# Patient Record
Sex: FEMALE | Race: WHITE | NOT HISPANIC OR LATINO | Employment: FULL TIME | URBAN - METROPOLITAN AREA
[De-identification: names, ages, dates, MRNs, and addresses within clinical notes are randomized per-mention and may not be internally consistent; named-entity substitution may affect disease eponyms.]

---

## 2017-05-23 DIAGNOSIS — Z12.31 ENCOUNTER FOR SCREENING MAMMOGRAM FOR MALIGNANT NEOPLASM OF BREAST: ICD-10-CM

## 2017-07-06 ENCOUNTER — HOSPITAL ENCOUNTER (OUTPATIENT)
Dept: MAMMOGRAPHY | Facility: MEDICAL CENTER | Age: 47
Discharge: HOME | End: 2017-07-06
Payer: COMMERCIAL

## 2017-07-06 DIAGNOSIS — Z12.31 ENCOUNTER FOR SCREENING MAMMOGRAM FOR MALIGNANT NEOPLASM OF BREAST: ICD-10-CM

## 2017-07-06 PROCEDURE — G0202 SCR MAMMO BI INCL CAD: HCPCS

## 2017-07-06 PROCEDURE — 77063 BREAST TOMOSYNTHESIS BI: CPT

## 2017-07-11 ENCOUNTER — ALLSCRIPTS OFFICE VISIT (OUTPATIENT)
Dept: OTHER | Facility: OTHER | Age: 47
End: 2017-07-11

## 2017-07-12 ENCOUNTER — GENERIC CONVERSION - ENCOUNTER (OUTPATIENT)
Dept: OTHER | Facility: OTHER | Age: 47
End: 2017-07-12

## 2017-07-17 ENCOUNTER — GENERIC CONVERSION - ENCOUNTER (OUTPATIENT)
Dept: OTHER | Facility: OTHER | Age: 47
End: 2017-07-17

## 2017-07-17 LAB
ADEQUACY: (HISTORICAL): NORMAL
CLINICIAN PROVIDIED ICD 9 OR 10 (HISTORICAL): NORMAL
COMMENT (HISTORICAL): NORMAL
DIAGNOSIS (HISTORICAL): NORMAL
NOTE: (HISTORICAL): NORMAL
PERFORMED BY (HISTORICAL): NORMAL
REFLEX (HISTORICAL): NORMAL

## 2018-01-15 VITALS
BODY MASS INDEX: 24.93 KG/M2 | WEIGHT: 135.5 LBS | SYSTOLIC BLOOD PRESSURE: 102 MMHG | DIASTOLIC BLOOD PRESSURE: 64 MMHG | HEIGHT: 62 IN

## 2018-06-07 ENCOUNTER — TRANSCRIBE ORDERS (OUTPATIENT)
Dept: ADMINISTRATIVE | Facility: HOSPITAL | Age: 48
End: 2018-06-07

## 2018-06-07 DIAGNOSIS — Z12.39 ENCOUNTER FOR SCREENING FOR MALIGNANT NEOPLASM OF BREAST: Primary | ICD-10-CM

## 2018-06-07 DIAGNOSIS — Z12.39 SCREENING BREAST EXAMINATION: Primary | ICD-10-CM

## 2018-07-10 ENCOUNTER — HOSPITAL ENCOUNTER (OUTPATIENT)
Dept: MAMMOGRAPHY | Facility: MEDICAL CENTER | Age: 48
Discharge: HOME | End: 2018-07-10
Payer: COMMERCIAL

## 2018-07-10 DIAGNOSIS — Z12.39 ENCOUNTER FOR SCREENING FOR MALIGNANT NEOPLASM OF BREAST: ICD-10-CM

## 2018-07-10 PROCEDURE — 77063 BREAST TOMOSYNTHESIS BI: CPT

## 2018-07-10 PROCEDURE — 77067 SCR MAMMO BI INCL CAD: CPT

## 2018-07-12 ENCOUNTER — ANNUAL EXAM (OUTPATIENT)
Dept: GYNECOLOGY | Facility: CLINIC | Age: 48
End: 2018-07-12

## 2018-07-12 VITALS
WEIGHT: 133.2 LBS | BODY MASS INDEX: 24.51 KG/M2 | HEIGHT: 62 IN | DIASTOLIC BLOOD PRESSURE: 72 MMHG | SYSTOLIC BLOOD PRESSURE: 116 MMHG

## 2018-07-12 DIAGNOSIS — Z01.419 ENCOUNTER FOR GYNECOLOGICAL EXAMINATION WITHOUT ABNORMAL FINDING: Primary | ICD-10-CM

## 2018-07-12 PROCEDURE — G0145 SCR C/V CYTO,THINLAYER,RESCR: HCPCS | Performed by: OBSTETRICS & GYNECOLOGY

## 2018-07-12 PROCEDURE — S0612 ANNUAL GYNECOLOGICAL EXAMINA: HCPCS | Performed by: OBSTETRICS & GYNECOLOGY

## 2018-07-12 RX ORDER — CALCIUM CARBONATE 500(1250)
TABLET ORAL
COMMUNITY

## 2018-07-12 RX ORDER — AMOXICILLIN 500 MG
CAPSULE ORAL DAILY
COMMUNITY

## 2018-07-12 RX ORDER — MELATONIN
1000 DAILY
COMMUNITY

## 2018-07-12 RX ORDER — ASPIRIN 81 MG/1
1 TABLET, CHEWABLE ORAL DAILY
COMMUNITY
End: 2021-08-06

## 2018-07-12 NOTE — PROGRESS NOTES
Assessment/Plan:    No problem-specific Assessment & Plan notes found for this encounter  Diagnoses and all orders for this visit:    Encounter for gynecological examination without abnormal finding    Other orders  -     aspirin (ASPIRIN 81) 81 mg chewable tablet; Chew 1 tablet daily  -     Calcium 500 MG tablet; Take by mouth  -     Omega-3 Fatty Acids (FISH OIL) 1200 MG CAPS; Take by mouth daily  -     ascorbic acid (SM VITAMIN C) 1000 MG tablet; Take by mouth  -     cholecalciferol (VITAMIN D3) 1,000 units tablet; Take 1,000 mg by mouth daily        Subjective:      Patient ID: Juan Oglesby is a 50 y o  female  HPI  No c/o  Menses still regular  1 cycle closer than 21 days  The following portions of the patient's history were reviewed and updated as appropriate: allergies, current medications, past family history, past medical history, past social history, past surgical history and problem list     Review of Systems   Constitutional: Negative  Gastrointestinal: Negative  Genitourinary: Negative  Objective:      /72   Ht 5' 2" (1 575 m)   Wt 60 4 kg (133 lb 3 2 oz)   LMP 07/06/2018   BMI 24 36 kg/m²          Physical Exam   Constitutional: She appears well-developed and well-nourished  Neck: Normal range of motion  Neck supple  No thyromegaly present  Cardiovascular: Normal rate, regular rhythm and normal heart sounds  Pulmonary/Chest: Effort normal and breath sounds normal  Right breast exhibits no inverted nipple, no mass, no nipple discharge, no skin change and no tenderness  Left breast exhibits no inverted nipple, no mass, no nipple discharge, no skin change and no tenderness  Abdominal: Soft  Bowel sounds are normal  She exhibits no distension and no mass  There is no tenderness  Hernia confirmed negative in the right inguinal area and confirmed negative in the left inguinal area  Genitourinary: There is no rash or lesion on the right labia   There is no rash or lesion on the left labia  Uterus is enlarged  Uterus is not deviated, not fixed and not tender  Cervix exhibits no motion tenderness, no discharge and no friability  Right adnexum displays no mass, no tenderness and no fullness  Left adnexum displays no mass, no tenderness and no fullness  No erythema or bleeding in the vagina  No vaginal discharge found  Genitourinary Comments: Uterus top normal size, c/w last exam   Lymphadenopathy:        Right: No inguinal adenopathy present  Left: No inguinal adenopathy present

## 2018-07-18 LAB
LAB AP GYN PRIMARY INTERPRETATION: NORMAL
Lab: NORMAL

## 2019-06-18 ENCOUNTER — TRANSCRIBE ORDERS (OUTPATIENT)
Dept: ADMINISTRATIVE | Facility: HOSPITAL | Age: 49
End: 2019-06-18

## 2019-06-18 DIAGNOSIS — Z12.39 BREAST SCREENING, UNSPECIFIED: Primary | ICD-10-CM

## 2019-07-18 ENCOUNTER — APPOINTMENT (OUTPATIENT)
Dept: LAB | Facility: MEDICAL CENTER | Age: 49
End: 2019-07-18
Payer: COMMERCIAL

## 2019-07-18 ENCOUNTER — ANNUAL EXAM (OUTPATIENT)
Dept: GYNECOLOGY | Facility: CLINIC | Age: 49
End: 2019-07-18
Payer: COMMERCIAL

## 2019-07-18 VITALS
WEIGHT: 141.4 LBS | BODY MASS INDEX: 26.02 KG/M2 | HEART RATE: 87 BPM | HEIGHT: 62 IN | DIASTOLIC BLOOD PRESSURE: 70 MMHG | SYSTOLIC BLOOD PRESSURE: 110 MMHG

## 2019-07-18 DIAGNOSIS — R63.5 WEIGHT GAIN: ICD-10-CM

## 2019-07-18 DIAGNOSIS — R63.5 WEIGHT GAIN: Primary | ICD-10-CM

## 2019-07-18 DIAGNOSIS — Z12.4 ENCOUNTER FOR PAPANICOLAOU SMEAR FOR CERVICAL CANCER SCREENING: ICD-10-CM

## 2019-07-18 DIAGNOSIS — Z01.419 ENCOUNTER FOR GYNECOLOGICAL EXAMINATION WITHOUT ABNORMAL FINDING: ICD-10-CM

## 2019-07-18 DIAGNOSIS — D21.9 FIBROIDS: ICD-10-CM

## 2019-07-18 LAB — TSH SERPL DL<=0.05 MIU/L-ACNC: 1.56 UIU/ML (ref 0.36–3.74)

## 2019-07-18 PROCEDURE — 84443 ASSAY THYROID STIM HORMONE: CPT

## 2019-07-18 PROCEDURE — 36415 COLL VENOUS BLD VENIPUNCTURE: CPT

## 2019-07-18 PROCEDURE — G0145 SCR C/V CYTO,THINLAYER,RESCR: HCPCS | Performed by: OBSTETRICS & GYNECOLOGY

## 2019-07-18 PROCEDURE — S0612 ANNUAL GYNECOLOGICAL EXAMINA: HCPCS | Performed by: OBSTETRICS & GYNECOLOGY

## 2019-07-18 NOTE — PROGRESS NOTES
Assessment/Plan:         Diagnoses and all orders for this visit:    Weight gain  -     TSH, 3rd generation with Free T4 reflex; Future    Encounter for gynecological examination without abnormal finding        Subjective:      Patient ID: Shira Ontiveros is a 46 y o  female  HPI  patient presents to the office for annual examination  She offers no complaints  She continues to have regular menses  Her only issues weight gain    The following portions of the patient's history were reviewed and updated as appropriate:   She  has a past medical history of Malignant melanoma of skin ([address] )  She There are no active problems to display for this patient  She  has a past surgical history that includes Adenoidectomy; Anal fissurectomy; Other surgical history; Tonsillectomy; Eye surgery; and Mouth surgery  Her family history includes Prostate cancer in her father; Uterine cancer in her maternal aunt  She  reports that she has never smoked  She has never used smokeless tobacco  She reports that she drinks alcohol  She reports that she does not use drugs  Current Outpatient Medications   Medication Sig Dispense Refill    ascorbic acid (SM VITAMIN C) 1000 MG tablet Take by mouth      aspirin (ASPIRIN 81) 81 mg chewable tablet Chew 1 tablet daily      Calcium 500 MG tablet Take by mouth      cholecalciferol (VITAMIN D3) 1,000 units tablet Take 1,000 mg by mouth daily      Omega-3 Fatty Acids (FISH OIL) 1200 MG CAPS Take by mouth daily       No current facility-administered medications for this visit        Current Outpatient Medications on File Prior to Visit   Medication Sig    ascorbic acid (SM VITAMIN C) 1000 MG tablet Take by mouth    aspirin (ASPIRIN 81) 81 mg chewable tablet Chew 1 tablet daily    Calcium 500 MG tablet Take by mouth    cholecalciferol (VITAMIN D3) 1,000 units tablet Take 1,000 mg by mouth daily    Omega-3 Fatty Acids (FISH OIL) 1200 MG CAPS Take by mouth daily     No current facility-administered medications on file prior to visit  She has No Known Allergies       Review of Systems   Constitutional: Positive for unexpected weight change  Negative for activity change, appetite change and fatigue  HENT: Negative for sore throat and trouble swallowing  Respiratory: Negative  Cardiovascular: Negative  Gastrointestinal: Negative  Genitourinary: Negative  Objective:      /70 (BP Location: Left arm)   Pulse 87   Ht 5' 2" (1 575 m)   Wt 64 1 kg (141 lb 6 4 oz)   LMP 06/30/2019   BMI 25 86 kg/m²          Physical Exam   Constitutional: She appears well-developed and well-nourished  Neck: Normal range of motion  Neck supple  No thyromegaly present  Cardiovascular: Normal rate, regular rhythm and normal heart sounds  Pulmonary/Chest: Effort normal and breath sounds normal  No respiratory distress  Right breast exhibits no inverted nipple, no mass, no nipple discharge, no skin change and no tenderness  Left breast exhibits no inverted nipple, no mass, no nipple discharge, no skin change and no tenderness  Abdominal: Soft  Bowel sounds are normal  She exhibits no distension and no mass  There is no tenderness  There is no rebound and no guarding  Hernia confirmed negative in the right inguinal area and confirmed negative in the left inguinal area  Genitourinary: There is no rash, tenderness or lesion on the right labia  There is no rash, tenderness or lesion on the left labia  Uterus is enlarged  Uterus is not deviated, not fixed and not tender  Cervix exhibits no motion tenderness, no discharge and no friability  Right adnexum displays no mass, no tenderness and no fullness  Left adnexum displays no mass, no tenderness and no fullness  No erythema, tenderness or bleeding in the vagina  No vaginal discharge found  Genitourinary Comments: Uterus top normal size ( stable )   Lymphadenopathy:     She has no cervical adenopathy   No inguinal adenopathy noted on the right or left side

## 2019-07-22 LAB
LAB AP GYN PRIMARY INTERPRETATION: NORMAL
Lab: NORMAL

## 2019-07-25 ENCOUNTER — HOSPITAL ENCOUNTER (OUTPATIENT)
Dept: MAMMOGRAPHY | Facility: MEDICAL CENTER | Age: 49
Discharge: HOME | End: 2019-07-25
Payer: COMMERCIAL

## 2019-07-25 VITALS — WEIGHT: 138 LBS | HEIGHT: 62 IN | BODY MASS INDEX: 25.4 KG/M2

## 2019-07-25 DIAGNOSIS — Z12.39 BREAST SCREENING, UNSPECIFIED: ICD-10-CM

## 2019-07-25 PROCEDURE — 77063 BREAST TOMOSYNTHESIS BI: CPT

## 2019-07-25 PROCEDURE — 77067 SCR MAMMO BI INCL CAD: CPT

## 2020-07-15 ENCOUNTER — TRANSCRIBE ORDERS (OUTPATIENT)
Dept: ADMINISTRATIVE | Facility: HOSPITAL | Age: 50
End: 2020-07-15

## 2020-07-15 DIAGNOSIS — Z12.31 ENCOUNTER FOR SCREENING MAMMOGRAM FOR MALIGNANT NEOPLASM OF BREAST: Primary | ICD-10-CM

## 2020-07-28 ENCOUNTER — ANNUAL EXAM (OUTPATIENT)
Dept: GYNECOLOGY | Facility: CLINIC | Age: 50
End: 2020-07-28
Payer: COMMERCIAL

## 2020-07-28 VITALS
DIASTOLIC BLOOD PRESSURE: 78 MMHG | HEART RATE: 88 BPM | WEIGHT: 138 LBS | HEIGHT: 63 IN | BODY MASS INDEX: 24.45 KG/M2 | SYSTOLIC BLOOD PRESSURE: 118 MMHG

## 2020-07-28 DIAGNOSIS — Z13.820 SCREENING FOR OSTEOPOROSIS: ICD-10-CM

## 2020-07-28 DIAGNOSIS — Z01.419 ENCOUNTER FOR GYNECOLOGICAL EXAMINATION WITH PAPANICOLAOU SMEAR OF CERVIX: ICD-10-CM

## 2020-07-28 DIAGNOSIS — Z12.31 ENCOUNTER FOR SCREENING MAMMOGRAM FOR MALIGNANT NEOPLASM OF BREAST: Primary | ICD-10-CM

## 2020-07-28 DIAGNOSIS — Z12.11 SCREENING FOR COLON CANCER: ICD-10-CM

## 2020-07-28 PROCEDURE — G0145 SCR C/V CYTO,THINLAYER,RESCR: HCPCS | Performed by: PATHOLOGY

## 2020-07-28 PROCEDURE — 76977 US BONE DENSITY MEASURE: CPT | Performed by: OBSTETRICS & GYNECOLOGY

## 2020-07-28 PROCEDURE — G0124 SCREEN C/V THIN LAYER BY MD: HCPCS | Performed by: PATHOLOGY

## 2020-07-28 PROCEDURE — S0612 ANNUAL GYNECOLOGICAL EXAMINA: HCPCS | Performed by: OBSTETRICS & GYNECOLOGY

## 2020-07-28 NOTE — PROGRESS NOTES
Assessment/Plan:         Diagnoses and all orders for this visit:    Encounter for screening mammogram for malignant neoplasm of breast  -     Mammo screening bilateral w 3d & cad; Future    Encounter for gynecological examination with Papanicolaou smear of cervix  -     Cancel: Liquid-based pap, screening  -     Liquid-based pap, screening    Screening for osteoporosis: heel scan: 0 1    Screening for colon cancer: options discussed; will do cologuard      Fibroid uterus--stable    Subjective:      Patient ID: Chiqui Knowles is a 44 y o  female  HPI   patient presents for annual examination  Her last menses was in April  She then an episode of bleeding again this month  During the few 3 months she had no menses she did experience hot flashes  She denies any vaginal irritation, burning, discharge or bleeding  Denies any dysuria, hematuria urgency or stress urinary incontinence  No GI complaints  The following portions of the patient's history were reviewed and updated as appropriate:   She  has a past medical history of Malignant melanoma of skin ([facility] [address] )  She There are no active problems to display for this patient  She  has a past surgical history that includes Adenoidectomy; Anal fissurectomy; Other surgical history; Tonsillectomy; Eye surgery; Mouth surgery; and Breast cyst aspiration (Right, 2013)  Her family history includes Prostate cancer in her father; Uterine cancer in her maternal aunt  She  reports that she has never smoked  She has never used smokeless tobacco  She reports that she drinks alcohol  She reports that she does not use drugs    Current Outpatient Medications   Medication Sig Dispense Refill    ascorbic acid (SM VITAMIN C) 1000 MG tablet Take by mouth      Calcium 500 MG tablet Take by mouth      cholecalciferol (VITAMIN D3) 1,000 units tablet Take 1,000 mg by mouth daily      Omega-3 Fatty Acids (FISH OIL) 1200 MG CAPS Take by mouth daily      aspirin (ASPIRIN 81) 81 mg chewable tablet Chew 1 tablet daily       No current facility-administered medications for this visit  Current Outpatient Medications on File Prior to Visit   Medication Sig    ascorbic acid (SM VITAMIN C) 1000 MG tablet Take by mouth    Calcium 500 MG tablet Take by mouth    cholecalciferol (VITAMIN D3) 1,000 units tablet Take 1,000 mg by mouth daily    Omega-3 Fatty Acids (FISH OIL) 1200 MG CAPS Take by mouth daily    aspirin (ASPIRIN 81) 81 mg chewable tablet Chew 1 tablet daily     No current facility-administered medications on file prior to visit  She has No Known Allergies       Review of Systems   Constitutional: Negative  HENT: Negative for sore throat and trouble swallowing  Gastrointestinal: Negative  Genitourinary: Negative  Objective:      /78 (BP Location: Left arm, Patient Position: Sitting, Cuff Size: Standard)   Pulse 88   Ht 5' 2 5" (1 588 m)   Wt 62 6 kg (138 lb)   LMP 07/25/2020   BMI 24 84 kg/m²          Physical Exam   Constitutional: She appears well-developed and well-nourished  Neck: Normal range of motion  Neck supple  No thyromegaly present  Cardiovascular: Normal rate, regular rhythm and normal heart sounds  Pulmonary/Chest: Effort normal and breath sounds normal  No respiratory distress  Right breast exhibits no inverted nipple, no mass, no nipple discharge, no skin change and no tenderness  Left breast exhibits no inverted nipple, no mass, no nipple discharge, no skin change and no tenderness  Abdominal: Soft  Bowel sounds are normal  She exhibits no distension and no mass  There is no tenderness  There is no rebound and no guarding  No hernia  Hernia confirmed negative in the right inguinal area and confirmed negative in the left inguinal area  Genitourinary: There is no rash, tenderness or lesion on the right labia  There is no rash, tenderness or lesion on the left labia  Uterus is enlarged (R/V post fibroid--stable)   Uterus is not deviated, not fixed and not tender  Cervix exhibits no motion tenderness, no discharge and no friability  Right adnexum displays no mass, no tenderness and no fullness  Left adnexum displays no mass, no tenderness and no fullness  No erythema or tenderness in the vagina  No signs of injury around the vagina  No vaginal discharge found  Lymphadenopathy:     She has no cervical adenopathy  No inguinal adenopathy noted on the right or left side

## 2020-08-05 LAB
LAB AP GYN PRIMARY INTERPRETATION: NORMAL
Lab: NORMAL
PATH INTERP SPEC-IMP: NORMAL

## 2020-09-16 ENCOUNTER — HOSPITAL ENCOUNTER (OUTPATIENT)
Dept: MAMMOGRAPHY | Facility: MEDICAL CENTER | Age: 50
Discharge: HOME | End: 2020-09-16
Payer: COMMERCIAL

## 2020-09-16 VITALS — HEIGHT: 62 IN | BODY MASS INDEX: 25.4 KG/M2 | WEIGHT: 138 LBS

## 2020-09-16 DIAGNOSIS — Z12.31 ENCOUNTER FOR SCREENING MAMMOGRAM FOR MALIGNANT NEOPLASM OF BREAST: ICD-10-CM

## 2020-09-16 PROCEDURE — 77063 BREAST TOMOSYNTHESIS BI: CPT

## 2020-09-16 PROCEDURE — 77067 SCR MAMMO BI INCL CAD: CPT

## 2021-08-06 ENCOUNTER — ANNUAL EXAM (OUTPATIENT)
Dept: GYNECOLOGY | Facility: CLINIC | Age: 51
End: 2021-08-06
Payer: COMMERCIAL

## 2021-08-06 VITALS
WEIGHT: 141 LBS | DIASTOLIC BLOOD PRESSURE: 76 MMHG | BODY MASS INDEX: 25.95 KG/M2 | HEART RATE: 78 BPM | SYSTOLIC BLOOD PRESSURE: 112 MMHG | HEIGHT: 62 IN

## 2021-08-06 DIAGNOSIS — D21.9 FIBROIDS: ICD-10-CM

## 2021-08-06 DIAGNOSIS — Z12.31 ENCOUNTER FOR SCREENING MAMMOGRAM FOR MALIGNANT NEOPLASM OF BREAST: Primary | ICD-10-CM

## 2021-08-06 DIAGNOSIS — N95.2 ATROPHIC VAGINITIS: ICD-10-CM

## 2021-08-06 DIAGNOSIS — Z01.419 ENCOUNTER FOR GYNECOLOGICAL EXAMINATION WITH PAPANICOLAOU SMEAR OF CERVIX: ICD-10-CM

## 2021-08-06 DIAGNOSIS — Z01.419 ENCOUNTER FOR GYNECOLOGICAL EXAMINATION WITHOUT ABNORMAL FINDING: ICD-10-CM

## 2021-08-06 PROCEDURE — G0145 SCR C/V CYTO,THINLAYER,RESCR: HCPCS | Performed by: OBSTETRICS & GYNECOLOGY

## 2021-08-06 PROCEDURE — S0612 ANNUAL GYNECOLOGICAL EXAMINA: HCPCS | Performed by: OBSTETRICS & GYNECOLOGY

## 2021-08-06 NOTE — PROGRESS NOTES
Assessment/Plan:         Diagnoses and all orders for this visit:    Encounter for screening mammogram for malignant neoplasm of breast  -     Mammo screening bilateral w 3d & cad; Future    Encounter for gynecological examination without abnormal finding    Atrophic vaginitis: asymptomatic--follow    Fibroids: stable        Subjective:      Patient ID: Nolan Casarez is a 44 y o  female  HPI  patient presents for annual examination  Her last menstrual period was 1 year ago  She is tolerating vasomotor symptoms  She denies any vaginal irritation, burning, discharge or bleeding  Denies any dysuria, hematuria urgency or urinary incontinence  No GI complaints  Colon cancer screening via colo guard August of 2020: Negative     Osteoporosis screening via heel scan July 2020:0 1    The following portions of the patient's history were reviewed and updated as appropriate:   She  has a past medical history of Malignant melanoma of skin ([facility] [address] )  She There are no problems to display for this patient  She  has a past surgical history that includes Adenoidectomy; Anal fissurectomy; Other surgical history; Tonsillectomy; Eye surgery; Mouth surgery; and Breast cyst aspiration (Right, 2013)  Her family history includes Prostate cancer in her father; Uterine cancer in her maternal aunt  She  reports that she has never smoked  She has never used smokeless tobacco  She reports current alcohol use  She reports that she does not use drugs  Current Outpatient Medications   Medication Sig Dispense Refill    ascorbic acid (SM VITAMIN C) 1000 MG tablet Take by mouth      Calcium 500 MG tablet Take by mouth      cholecalciferol (VITAMIN D3) 1,000 units tablet Take 1,000 mg by mouth daily      Omega-3 Fatty Acids (FISH OIL) 1200 MG CAPS Take by mouth daily       No current facility-administered medications for this visit       Current Outpatient Medications on File Prior to Visit   Medication Sig    ascorbic acid (SM VITAMIN C) 1000 MG tablet Take by mouth    Calcium 500 MG tablet Take by mouth    cholecalciferol (VITAMIN D3) 1,000 units tablet Take 1,000 mg by mouth daily    Omega-3 Fatty Acids (FISH OIL) 1200 MG CAPS Take by mouth daily    [DISCONTINUED] aspirin (ASPIRIN 81) 81 mg chewable tablet Chew 1 tablet daily     No current facility-administered medications on file prior to visit  She has No Known Allergies       Review of Systems   Constitutional: Negative  HENT: Negative for sore throat and trouble swallowing  Gastrointestinal: Negative  Genitourinary: Negative  Objective:      /76 (BP Location: Left arm, Patient Position: Sitting, Cuff Size: Standard)   Pulse 78   Ht 5' 1 5" (1 562 m)   Wt 64 kg (141 lb)   BMI 26 21 kg/m²          Physical Exam  Vitals reviewed  Constitutional:       Appearance: Normal appearance  She is normal weight  Cardiovascular:      Rate and Rhythm: Normal rate and regular rhythm  Pulses: Normal pulses  Heart sounds: Normal heart sounds  No murmur heard  Pulmonary:      Effort: Pulmonary effort is normal  No respiratory distress  Breath sounds: Normal breath sounds  Chest:      Breasts:         Right: No swelling, bleeding, inverted nipple, mass, nipple discharge, skin change or tenderness  Left: No swelling, bleeding, inverted nipple, mass, nipple discharge, skin change or tenderness  Abdominal:      General: There is no distension  Palpations: Abdomen is soft  There is no mass  Tenderness: There is no abdominal tenderness  There is no guarding or rebound  Hernia: No hernia is present  There is no hernia in the left inguinal area or right inguinal area  Genitourinary:     General: Normal vulva  Labia:         Right: No rash, tenderness or lesion  Left: No rash, tenderness or lesion  Vagina: Normal       Cervix: Normal       Uterus: Enlarged (6 wk size)   Not deviated, not fixed, not tender and no uterine prolapse  Adnexa:         Right: No mass, tenderness or fullness  Left: No mass, tenderness or fullness  Musculoskeletal:      Cervical back: Normal range of motion and neck supple  No tenderness  Lymphadenopathy:      Cervical: No cervical adenopathy  Upper Body:      Right upper body: No supraclavicular, axillary or pectoral adenopathy  Left upper body: No supraclavicular, axillary or pectoral adenopathy  Lower Body: No right inguinal adenopathy  No left inguinal adenopathy  Neurological:      Mental Status: She is alert

## 2021-08-12 LAB
LAB AP GYN PRIMARY INTERPRETATION: NORMAL
Lab: NORMAL

## 2021-09-17 ENCOUNTER — HOSPITAL ENCOUNTER (OUTPATIENT)
Dept: RADIOLOGY | Facility: IMAGING CENTER | Age: 51
Discharge: HOME | End: 2021-09-17
Payer: COMMERCIAL

## 2021-09-17 VITALS — BODY MASS INDEX: 26.43 KG/M2 | WEIGHT: 140 LBS | HEIGHT: 61 IN

## 2021-09-17 DIAGNOSIS — Z12.31 ENCOUNTER FOR SCREENING MAMMOGRAM FOR MALIGNANT NEOPLASM OF BREAST: ICD-10-CM

## 2021-09-17 PROCEDURE — 77063 BREAST TOMOSYNTHESIS BI: CPT

## 2021-09-17 PROCEDURE — 77067 SCR MAMMO BI INCL CAD: CPT

## 2022-08-17 ENCOUNTER — OFFICE VISIT (OUTPATIENT)
Dept: GYNECOLOGY | Facility: CLINIC | Age: 52
End: 2022-08-17
Payer: COMMERCIAL

## 2022-08-17 ENCOUNTER — ULTRASOUND (OUTPATIENT)
Dept: GYNECOLOGY | Facility: CLINIC | Age: 52
End: 2022-08-17
Payer: COMMERCIAL

## 2022-08-17 DIAGNOSIS — N95.0 PMB (POSTMENOPAUSAL BLEEDING): Primary | ICD-10-CM

## 2022-08-17 PROCEDURE — 58100 BIOPSY OF UTERUS LINING: CPT | Performed by: OBSTETRICS & GYNECOLOGY

## 2022-08-17 PROCEDURE — 99212 OFFICE O/P EST SF 10 MIN: CPT | Performed by: OBSTETRICS & GYNECOLOGY

## 2022-08-17 PROCEDURE — 58340 CATHETER FOR HYSTEROGRAPHY: CPT | Performed by: OBSTETRICS & GYNECOLOGY

## 2022-08-17 PROCEDURE — 76831 ECHO EXAM UTERUS: CPT | Performed by: OBSTETRICS & GYNECOLOGY

## 2022-08-17 PROCEDURE — 76830 TRANSVAGINAL US NON-OB: CPT

## 2022-08-17 PROCEDURE — 88305 TISSUE EXAM BY PATHOLOGIST: CPT | Performed by: PATHOLOGY

## 2022-08-17 NOTE — PROGRESS NOTES
AMB US Pelvic Non OB    Date/Time: 8/17/2022 6:49 AM  Performed by: Lele Ramos  Authorized by: Keke Fournier DO   Universal Protocol:  Patient identity confirmed: verbally with patient      Procedure details:     Technique:  Transvaginal US, Non-OB    Position: lithotomy exam    Uterine findings:     Length (cm): 7 44    Height (cm):  4 3    Width (cm):  5 27    Endometrial stripe: identified      Endometrium thickness (mm):  4 58  Left ovary findings:     Left ovary:  Visualized    Length (cm): 2 95    Height (cm): 1 92    Width (cm): 2 17  Right ovary findings:     Right ovary:  Visualized    Length (cm): 2 36  Other findings:     Free pelvic fluid: identified    Post-Procedure Details:     Impression:  Retroverted uterus demonstrates multiple fibroids  Largest are: right posterior subserosal 2 7cm, right anterior subserosal 2 8cm, right anterior subserosal 2 7cm, left posterior subserosal 5 3cm, left posterior subserosal 2 2cm, and anterior fundal intramural 1 9cm  The endometrium is borderline thickened at 4 6cm  The bilateral ovaries appear within normal limits  Small amount of free fluid is noted  Tolerance: Tolerated well, no immediate complications    Complications: no complications    Additional Procedure Comments:      GE Voluson P8 transvaginal transducer RIC5-RA with Serial Number [id number] was used during procedure and subsequently cleaned with high level disinfection utilizing the eziCONEX       Ultrasound performed at:     [facility] [address]  [address]  [address] [place] [address] Rd, [address] [facility]  Phone: [phone]  Fax:  [phone]    Date/Time: 8/17/2022 6:50 AM  Performed by: Keke Fournier DO  Authorized by: Keke Fournier DO   Universal Protocol:  Patient identity confirmed: verbally with patient      Pre-procedure:     Prepped with: Betadine    Procedure:     Cervix cleaned and prepped: yes      Uterus sounded: yes      Catheter inserted: yes      Uterine cavity distended with saline: yes    Post-procedure:     Patient observed: yes      Post procedure instructions given to patient: yes      Patient tolerated procedure well with no complications: yes    Comments:      Sonohysterogram demonstrates a smooth appearing endometrium     Endometrial biopsy    Date/Time: 8/17/2022 6:50 AM  Performed by: Romario Menendez DO  Authorized by: Romario Menendez DO   Universal Protocol:  Patient identity confirmed: verbally with patient      Indication:     Indications: Post-menopausal bleeding    Procedure:     Procedure: endometrial biopsy with Pipelle      A bivalve speculum was placed in the vagina: yes      Cervix cleaned and prepped: yes      Specimen collected: specimen collected and sent to pathology      Patient tolerated procedure well with no complications: yes

## 2022-08-17 NOTE — PROGRESS NOTES
Assessment/Plan:         Diagnoses and all orders for this visit:    PMB (postmenopausal bleeding); reviewed ultrasound findings with patient  Biopsy results pending  If she has another episode of bleeding she will contact the office      Fibroid uterus  Subjective:      Patient ID: Ignacio Montana is a 46 y o  female  HPI  patient had called the office complaining of episode of light vaginal bleeding  This lasted for 5 days  She was advised to return to the office for transvaginal scan possible biopsy possible saline infusion hysterosonography    The following portions of the patient's history were reviewed and updated as appropriate:   She  has a past medical history of Malignant melanoma of skin ([facility] [address] )  She There are no problems to display for this patient  She  has a past surgical history that includes Adenoidectomy; Anal fissurectomy; Other surgical history; Tonsillectomy; Eye surgery; Mouth surgery; and Breast cyst aspiration (Right, 2013)  Her family history includes Lung cancer (age of onset: 66) in her father; No Known Problems in her brother, brother, maternal grandfather, maternal grandmother, mother, paternal grandfather, and paternal grandmother; Prostate cancer (age of onset: 54) in her father; Uterine cancer in her maternal aunt  She  reports that she has never smoked  She has never used smokeless tobacco  She reports current alcohol use  She reports that she does not use drugs  Current Outpatient Medications   Medication Sig Dispense Refill    ascorbic acid (SM VITAMIN C) 1000 MG tablet Take by mouth      Calcium 500 MG tablet Take by mouth      cholecalciferol (VITAMIN D3) 1,000 units tablet Take 1,000 mg by mouth daily      Omega-3 Fatty Acids (FISH OIL) 1200 MG CAPS Take by mouth daily       No current facility-administered medications for this visit       Current Outpatient Medications on File Prior to Visit   Medication Sig    ascorbic acid (SM VITAMIN C) 1000 MG tablet Take by mouth    Calcium 500 MG tablet Take by mouth    cholecalciferol (VITAMIN D3) 1,000 units tablet Take 1,000 mg by mouth daily    Omega-3 Fatty Acids (FISH OIL) 1200 MG CAPS Take by mouth daily     No current facility-administered medications on file prior to visit  She has No Known Allergies       Review of Systems      Objective:      LMP 09/03/2020 (Approximate)          Physical Exam        AMB US Pelvic Non OB     Date/Time: 8/17/2022 6:49 AM  Performed by: Jam Cardoza  Authorized by: Jose Vidal DO   Universal Protocol:  Patient identity confirmed: verbally with patient        Procedure details:     Technique:  Transvaginal US, Non-OB    Position: lithotomy exam    Uterine findings:     Length (cm): 7 44    Height (cm):  4 3    Width (cm):  5 27    Endometrial stripe: identified      Endometrium thickness (mm):  4 58  Left ovary findings:     Left ovary:  Visualized    Length (cm): 2 95    Height (cm): 1 92    Width (cm): 2 17  Right ovary findings:     Right ovary:  Visualized    Length (cm): 2 36  Other findings:     Free pelvic fluid: identified    Post-Procedure Details:     Impression:  Retroverted uterus demonstrates multiple fibroids  Largest are: right posterior subserosal 2 7cm, right anterior subserosal 2 8cm, right anterior subserosal 2 7cm, left posterior subserosal 5 3cm, left posterior subserosal 2 2cm, and anterior fundal intramural 1 9cm  The endometrium is borderline thickened at 4 6cm  The bilateral ovaries appear within normal limits  Small amount of free fluid is noted  Tolerance:   Tolerated well, no immediate complications    Complications: no complications    Additional Procedure Comments:      GE Voluson P8 transvaginal transducer RIC5-RA with Serial Number [id number] was used during procedure and subsequently cleaned with high level disinfection utilizing the Perpetu       Ultrasound performed at:   [place] [address] for [address] [facility]  [address]  [address], [address]  Phone: [phone]  Fax:  [phone]     Sonohysterogram     Date/Time: 8/17/2022 6:50 AM  Performed by: Kailash Webb DO  Authorized by: Kailash Webb DO   Universal Protocol:  Patient identity confirmed: verbally with patient        Pre-procedure:     Prepped with: Betadine    Procedure:     Cervix cleaned and prepped: yes      Uterus sounded: yes      Catheter inserted: yes      Uterine cavity distended with saline: yes    Post-procedure:     Patient observed: yes      Post procedure instructions given to patient: yes      Patient tolerated procedure well with no complications: yes    Comments:      Sonohysterogram demonstrates a smooth appearing endometrium     Endometrial biopsy     Date/Time: 8/17/2022 6:50 AM  Performed by: Kailash Webb DO  Authorized by: Kailash Webb DO   Universal Protocol:  Patient identity confirmed: verbally with patient        Indication:     Indications: Post-menopausal bleeding    Procedure:     Procedure: endometrial biopsy with Pipelle      A bivalve speculum was placed in the vagina: yes      Cervix cleaned and prepped: yes      Specimen collected: specimen collected and sent to pathology      Patient tolerated procedure well with no complications: yes

## 2022-09-23 ENCOUNTER — ANNUAL EXAM (OUTPATIENT)
Dept: GYNECOLOGY | Facility: CLINIC | Age: 52
End: 2022-09-23
Payer: COMMERCIAL

## 2022-09-23 VITALS
WEIGHT: 151 LBS | HEART RATE: 90 BPM | BODY MASS INDEX: 27.79 KG/M2 | SYSTOLIC BLOOD PRESSURE: 118 MMHG | DIASTOLIC BLOOD PRESSURE: 68 MMHG | HEIGHT: 62 IN

## 2022-09-23 DIAGNOSIS — Z01.419 ENCOUNTER FOR GYNECOLOGICAL EXAMINATION WITHOUT ABNORMAL FINDING: Primary | ICD-10-CM

## 2022-09-23 DIAGNOSIS — Z13.820 SCREENING FOR OSTEOPOROSIS: ICD-10-CM

## 2022-09-23 DIAGNOSIS — D21.9 FIBROIDS: ICD-10-CM

## 2022-09-23 DIAGNOSIS — Z01.419 ENCOUNTER FOR GYNECOLOGICAL EXAMINATION WITH PAPANICOLAOU SMEAR OF CERVIX: ICD-10-CM

## 2022-09-23 DIAGNOSIS — N95.2 ATROPHIC VAGINITIS: ICD-10-CM

## 2022-09-23 PROCEDURE — 76977 US BONE DENSITY MEASURE: CPT | Performed by: OBSTETRICS & GYNECOLOGY

## 2022-09-23 PROCEDURE — G0145 SCR C/V CYTO,THINLAYER,RESCR: HCPCS | Performed by: OBSTETRICS & GYNECOLOGY

## 2022-09-23 PROCEDURE — S0612 ANNUAL GYNECOLOGICAL EXAMINA: HCPCS | Performed by: OBSTETRICS & GYNECOLOGY

## 2022-09-23 NOTE — PROGRESS NOTES
Assessment/Plan:         Diagnoses and all orders for this visit:    Encounter for gynecological examination without abnormal finding    Fibroids; stable and asymptomatic    Atrophic vaginitis; asymptomatic- follow    Screening for osteoporosis: heel scan: 0 1        Subjective:      Patient ID: Ignacio Montana is a 46 y o  female  HPI  patient presents for annual examination  She offers no complaints  She did have an episode of postmenopausal bleeding in August   She return to the office for transvaginal scan biopsy  The ultrasound confirmed a stable fibroids  She has had no further bleeding  Denies any vaginal irritation, burning, discharge or bleeding  Denies any dysuria, hematuria urgency or urinary incontinence  No GI complaints  Colon cancer screening via colo guard August of 2020: Negative     Osteoporosis screening via heel scan July of 2020 20:0 1    The following portions of the patient's history were reviewed and updated as appropriate:   She  has a past medical history of Malignant melanoma of skin ([facility] [address] )  She There are no problems to display for this patient  She  has a past surgical history that includes Adenoidectomy; Anal fissurectomy; Other surgical history; Tonsillectomy; Eye surgery; Mouth surgery; and Breast cyst aspiration (Right, 2013)  Her family history includes Lung cancer (age of onset: 66) in her father; No Known Problems in her brother, brother, maternal grandfather, maternal grandmother, mother, paternal grandfather, and paternal grandmother; Prostate cancer (age of onset: 54) in her father; Uterine cancer in her maternal aunt  She  reports that she has never smoked  She has never used smokeless tobacco  She reports current alcohol use  She reports that she does not use drugs    Current Outpatient Medications   Medication Sig Dispense Refill    ascorbic acid (SM VITAMIN C) 1000 MG tablet Take by mouth      Calcium 500 MG tablet Take by mouth      cholecalciferol (VITAMIN D3) 1,000 units tablet Take 1,000 mg by mouth daily      Omega-3 Fatty Acids (FISH OIL) 1200 MG CAPS Take by mouth daily       No current facility-administered medications for this visit  Current Outpatient Medications on File Prior to Visit   Medication Sig    ascorbic acid (SM VITAMIN C) 1000 MG tablet Take by mouth    Calcium 500 MG tablet Take by mouth    cholecalciferol (VITAMIN D3) 1,000 units tablet Take 1,000 mg by mouth daily    Omega-3 Fatty Acids (FISH OIL) 1200 MG CAPS Take by mouth daily     No current facility-administered medications on file prior to visit  She has No Known Allergies       Review of Systems   Constitutional: Negative  HENT: Negative for sore throat and trouble swallowing  Gastrointestinal: Negative  Genitourinary: Negative  Objective:      /68   Pulse 90   Ht 5' 2" (1 575 m)   Wt 68 5 kg (151 lb)   LMP 09/03/2020 (Approximate)   BMI 27 62 kg/m²          Physical Exam  Vitals reviewed  Constitutional:       Appearance: Normal appearance  She is normal weight  Cardiovascular:      Rate and Rhythm: Normal rate and regular rhythm  Pulses: Normal pulses  Heart sounds: Normal heart sounds  No murmur heard  Pulmonary:      Effort: Pulmonary effort is normal  No respiratory distress  Breath sounds: Normal breath sounds  Chest:   Breasts:      Right: No swelling, bleeding, inverted nipple, mass, nipple discharge, skin change, tenderness, axillary adenopathy or supraclavicular adenopathy  Left: No swelling, bleeding, inverted nipple, mass, nipple discharge, skin change, tenderness, axillary adenopathy or supraclavicular adenopathy  Abdominal:      General: There is no distension  Palpations: Abdomen is soft  There is no mass  Tenderness: There is no abdominal tenderness  There is no guarding or rebound  Hernia: No hernia is present   There is no hernia in the left inguinal area or right inguinal area    Genitourinary:     General: Normal vulva  Labia:         Right: No rash, tenderness or lesion  Left: No rash, tenderness or lesion  Vagina: Normal       Cervix: Normal       Uterus: Enlarged (6-8 wk size)  Not deviated, not fixed, not tender and no uterine prolapse  Adnexa:         Right: No mass, tenderness or fullness  Left: No mass, tenderness or fullness  Musculoskeletal:      Cervical back: Normal range of motion and neck supple  No tenderness  Lymphadenopathy:      Cervical: No cervical adenopathy  Upper Body:      Right upper body: No supraclavicular, axillary or pectoral adenopathy  Left upper body: No supraclavicular, axillary or pectoral adenopathy  Lower Body: No right inguinal adenopathy  No left inguinal adenopathy  Neurological:      Mental Status: She is alert

## 2022-09-30 ENCOUNTER — HOSPITAL ENCOUNTER (OUTPATIENT)
Dept: RADIOLOGY | Facility: IMAGING CENTER | Age: 52
End: 2022-09-30
Payer: COMMERCIAL

## 2022-09-30 VITALS — HEIGHT: 61 IN | WEIGHT: 150 LBS | BODY MASS INDEX: 28.32 KG/M2

## 2022-09-30 DIAGNOSIS — Z12.31 SCREENING MAMMOGRAM FOR HIGH-RISK PATIENT: ICD-10-CM

## 2022-09-30 PROCEDURE — 77063 BREAST TOMOSYNTHESIS BI: CPT

## 2022-09-30 PROCEDURE — 77067 SCR MAMMO BI INCL CAD: CPT

## 2022-10-05 LAB
LAB AP GYN PRIMARY INTERPRETATION: NORMAL
Lab: NORMAL

## 2023-09-29 ENCOUNTER — ANNUAL EXAM (OUTPATIENT)
Dept: GYNECOLOGY | Facility: CLINIC | Age: 53
End: 2023-09-29
Payer: COMMERCIAL

## 2023-09-29 VITALS
HEIGHT: 61 IN | BODY MASS INDEX: 27.56 KG/M2 | DIASTOLIC BLOOD PRESSURE: 74 MMHG | SYSTOLIC BLOOD PRESSURE: 116 MMHG | HEART RATE: 94 BPM | WEIGHT: 146 LBS

## 2023-09-29 DIAGNOSIS — Z01.419 ENCOUNTER FOR GYNECOLOGICAL EXAMINATION WITH PAPANICOLAOU SMEAR OF CERVIX: Primary | ICD-10-CM

## 2023-09-29 DIAGNOSIS — Z12.11 COLON CANCER SCREENING: ICD-10-CM

## 2023-09-29 DIAGNOSIS — D21.9 FIBROIDS: ICD-10-CM

## 2023-09-29 DIAGNOSIS — B35.6 TINEA CRURIS: ICD-10-CM

## 2023-09-29 PROCEDURE — G0145 SCR C/V CYTO,THINLAYER,RESCR: HCPCS | Performed by: OBSTETRICS & GYNECOLOGY

## 2023-09-29 PROCEDURE — S0612 ANNUAL GYNECOLOGICAL EXAMINA: HCPCS | Performed by: OBSTETRICS & GYNECOLOGY

## 2023-09-29 RX ORDER — CLOTRIMAZOLE AND BETAMETHASONE DIPROPIONATE 10; .64 MG/G; MG/G
CREAM TOPICAL 2 TIMES DAILY
Qty: 45 G | Refills: 1 | Status: SHIPPED | OUTPATIENT
Start: 2023-09-29

## 2023-09-29 NOTE — PROGRESS NOTES
Assessment/Plan:         Diagnoses and all orders for this visit:    Encounter for gynecological examination with Papanicolaou smear of cervix  -     Liquid-based pap, screening    Colon cancer screening  -     Cologuard    Fibroids; stable-follow    Tinea cruris  -     clotrimazole-betamethasone (LOTRISONE) 1-0.05 % cream; Apply topically 2 (two) times a day    Other orders  -     MAGNESIUM ASPARTATE PO; Take 500 mg by mouth 2 (two) times a day        Subjective:      Patient ID: Sudheer Arguello is a 45 y.o. female. HPI patient presents for annual examination. She offers no complaints other than a pruritic genital rash. This has been unresponsive to topical hydrocortisone. She denies any vaginal discharge or bleeding. Denies any dysuria, hematuria urgency or urinary incontinence. She had a work-up last year for postmenopausal bleeding. Work-up was negative other than stable fibroids. She had several fibroids measuring 2.7 cm, 2.7, 2.8, 2.2, 5.3, and 1.9 cm. No GI complaints. Colon cancer screening via Cologuard August 2020. Negative    Osteoporosis screening via peripheral scan September 2022. 0.1    The following portions of the patient's history were reviewed and updated as appropriate:   She  has a past medical history of Cancer ([address]) (1975), Fibroid, Malignant melanoma of skin ([address]), and Menopause ovarian failure. She There are no problems to display for this patient. She  has a past surgical history that includes Adenoidectomy; Anal fissurectomy; Other surgical history; Tonsillectomy; Eye surgery; Mouth surgery; and Breast cyst aspiration (Right, 2013).   Her family history includes Cancer in her father; Diabetes in her brother and father; Hypertension in her brother and father; Lung cancer (age of onset: 66) in her father; No Known Problems in her brother, maternal grandfather, maternal grandmother, paternal grandfather, and paternal grandmother; Osteoarthritis in her mother; Prostate cancer (age of onset: 54) in her father; Uterine cancer in her maternal aunt. She  reports that she has never smoked. She has never used smokeless tobacco. She reports current alcohol use. She reports that she does not use drugs. Current Outpatient Medications   Medication Sig Dispense Refill   • ascorbic acid (SM VITAMIN C) 1000 MG tablet Take by mouth     • Calcium 500 MG tablet Take by mouth     • cholecalciferol (VITAMIN D3) 1,000 units tablet Take 1,000 mg by mouth daily     • clotrimazole-betamethasone (LOTRISONE) 1-0.05 % cream Apply topically 2 (two) times a day 45 g 1   • MAGNESIUM ASPARTATE PO Take 500 mg by mouth 2 (two) times a day     • Omega-3 Fatty Acids (FISH OIL) 1200 MG CAPS Take by mouth daily       No current facility-administered medications for this visit. Current Outpatient Medications on File Prior to Visit   Medication Sig   • ascorbic acid (SM VITAMIN C) 1000 MG tablet Take by mouth   • Calcium 500 MG tablet Take by mouth   • cholecalciferol (VITAMIN D3) 1,000 units tablet Take 1,000 mg by mouth daily   • MAGNESIUM ASPARTATE PO Take 500 mg by mouth 2 (two) times a day   • Omega-3 Fatty Acids (FISH OIL) 1200 MG CAPS Take by mouth daily     No current facility-administered medications on file prior to visit. She has No Known Allergies. .      Review of Systems   Constitutional: Negative. HENT: Negative for sore throat and trouble swallowing. Gastrointestinal: Negative. Genitourinary: Negative for difficulty urinating, dysuria, frequency, hematuria, menstrual problem, pelvic pain and vaginal discharge. Genital rash/pruritic         Objective:      /74   Pulse 94   Ht 5' 1" (1.549 m)   Wt 66.2 kg (146 lb)   LMP 09/03/2020 (Approximate)   BMI 27.59 kg/m²          Physical Exam  Vitals reviewed. Constitutional:       Appearance: Normal appearance. She is normal weight. Cardiovascular:      Rate and Rhythm: Normal rate and regular rhythm.       Pulses: Normal pulses. Heart sounds: Normal heart sounds. Pulmonary:      Effort: Pulmonary effort is normal. No respiratory distress. Breath sounds: Normal breath sounds. Chest:   Breasts:     Right: No swelling, bleeding, inverted nipple, mass, nipple discharge, skin change or tenderness. Left: No swelling, bleeding, inverted nipple, mass, nipple discharge, skin change or tenderness. Abdominal:      General: There is no distension. Palpations: Abdomen is soft. There is no mass. Tenderness: There is no abdominal tenderness. There is no guarding or rebound. Hernia: No hernia is present. There is no hernia in the left inguinal area or right inguinal area. Genitourinary:     General: Normal vulva. Labia:         Right: Rash present. No tenderness or lesion. Left: Rash present. No tenderness or lesion. Vagina: Normal.      Cervix: Normal.      Uterus: Enlarged (Top normal size-stable). Not deviated, not fixed, not tender and no uterine prolapse. Adnexa:         Right: No mass, tenderness or fullness. Left: No mass, tenderness or fullness. Comments: Rash consistent with tinea cruris  Musculoskeletal:      Cervical back: Normal range of motion and neck supple. Lymphadenopathy:      Upper Body:      Right upper body: No supraclavicular, axillary or pectoral adenopathy. Left upper body: No supraclavicular, axillary or pectoral adenopathy. Lower Body: No right inguinal adenopathy. No left inguinal adenopathy. Neurological:      Mental Status: She is alert.

## 2023-10-02 ENCOUNTER — HOSPITAL ENCOUNTER (OUTPATIENT)
Dept: RADIOLOGY | Facility: IMAGING CENTER | Age: 53
Discharge: HOME | End: 2023-10-02
Payer: COMMERCIAL

## 2023-10-02 VITALS — HEIGHT: 61 IN | BODY MASS INDEX: 27.55 KG/M2 | WEIGHT: 145.94 LBS

## 2023-10-02 DIAGNOSIS — Z12.31 ENCOUNTER FOR SCREENING MAMMOGRAM FOR MALIGNANT NEOPLASM OF BREAST: ICD-10-CM

## 2023-10-02 PROCEDURE — 77063 BREAST TOMOSYNTHESIS BI: CPT

## 2023-10-02 PROCEDURE — 77067 SCR MAMMO BI INCL CAD: CPT

## 2023-10-07 LAB — COLOGUARD RESULT REPORTABLE: NEGATIVE

## 2023-10-09 LAB
LAB AP GYN PRIMARY INTERPRETATION: NORMAL
Lab: NORMAL

## 2024-04-05 ENCOUNTER — OFFICE VISIT (OUTPATIENT)
Dept: GYNECOLOGY | Facility: CLINIC | Age: 54
End: 2024-04-05
Payer: COMMERCIAL

## 2024-04-05 DIAGNOSIS — Z78.0 MENOPAUSE: Primary | ICD-10-CM

## 2024-04-05 PROCEDURE — 99213 OFFICE O/P EST LOW 20 MIN: CPT | Performed by: OBSTETRICS & GYNECOLOGY

## 2024-04-05 RX ORDER — ESTRADIOL/NORETHINDRONE ACETATE TRANSDERMAL SYSTEM .05; .14 MG/D; MG/D
1 PATCH, EXTENDED RELEASE TRANSDERMAL 2 TIMES WEEKLY
Qty: 24 PATCH | Refills: 3 | Status: SHIPPED | OUTPATIENT
Start: 2024-04-08 | End: 2024-06-28

## 2024-04-05 NOTE — PROGRESS NOTES
Assessment/Plan:         Diagnoses and all orders for this visit:    Menopause discussed risks and benefits of HRT.  Will start on CombiPatch twice weekly.  If no alleviation of symptoms patient will contact the office.  -     estradiol-norethindrone (CombiPatch) 0.05-0.14 MG/DAY; Place 1 patch on the skin 2 (two) times a week for 24 doses      Subjective:      Patient ID: Yajaira Collins is a 53 y.o. female.    HPI patient presents the office to discuss initiating HRT.  She is experiencing vasomotor symptoms insomnia and some mood changes.  She is presently seeing a nutritionist for her prediabetes and for weight loss.  Her nutritionist recommended starting HRT.    The following portions of the patient's history were reviewed and updated as appropriate: She  has a past medical history of Cancer (HCC) (1975), Fibroid, Malignant melanoma of skin (HCC), and Menopause ovarian failure.  She There are no problems to display for this patient.   She  has a past surgical history that includes Adenoidectomy; Anal fissurectomy; Other surgical history; Tonsillectomy; Eye surgery; Mouth surgery; and Breast cyst aspiration (Right, 2013).  Her family history includes Cancer in her father; Diabetes in her brother and father; Hypertension in her brother and father; Lung cancer (age of onset: 78) in her father; No Known Problems in her brother, maternal grandfather, maternal grandmother, paternal grandfather, and paternal grandmother; Osteoarthritis in her mother; Prostate cancer (age of onset: 55) in her father; Uterine cancer in her maternal aunt.  She  reports that she has never smoked. She has never used smokeless tobacco. She reports current alcohol use. She reports that she does not use drugs.  Current Outpatient Medications   Medication Sig Dispense Refill    ascorbic acid (SM VITAMIN C) 1000 MG tablet Take by mouth      Calcium 500 MG tablet Take by mouth      cholecalciferol (VITAMIN D3) 1,000 units tablet Take 1,000 mg by  mouth daily      clotrimazole-betamethasone (LOTRISONE) 1-0.05 % cream Apply topically 2 (two) times a day 45 g 1    [START ON 4/8/2024] estradiol-norethindrone (CombiPatch) 0.05-0.14 MG/DAY Place 1 patch on the skin 2 (two) times a week for 24 doses 24 patch 3    MAGNESIUM ASPARTATE PO Take 500 mg by mouth 2 (two) times a day      Omega-3 Fatty Acids (FISH OIL) 1200 MG CAPS Take by mouth daily       No current facility-administered medications for this visit.     Current Outpatient Medications on File Prior to Visit   Medication Sig    ascorbic acid (SM VITAMIN C) 1000 MG tablet Take by mouth    Calcium 500 MG tablet Take by mouth    cholecalciferol (VITAMIN D3) 1,000 units tablet Take 1,000 mg by mouth daily    clotrimazole-betamethasone (LOTRISONE) 1-0.05 % cream Apply topically 2 (two) times a day    MAGNESIUM ASPARTATE PO Take 500 mg by mouth 2 (two) times a day    Omega-3 Fatty Acids (FISH OIL) 1200 MG CAPS Take by mouth daily     No current facility-administered medications on file prior to visit.     She has No Known Allergies..    Review of Systems      Objective:      LMP 09/03/2020 (Approximate)          Physical Exam

## 2024-04-25 ENCOUNTER — TELEPHONE (OUTPATIENT)
Age: 54
End: 2024-04-25

## 2024-04-25 NOTE — TELEPHONE ENCOUNTER
----- Message from Yajaira Collins sent at 4/25/2024  7:41 AM EDT -----  Regarding: HRT  Contact: [phone]  Good morning Dr. Nance,    I am experiencing minimal spotting the last few days. Mostly seen on the toilet tissue, but wanted you to be aware. Is there any concern?    Thank you.     Yajaira

## 2024-10-04 ENCOUNTER — ANNUAL EXAM (OUTPATIENT)
Dept: GYNECOLOGY | Facility: CLINIC | Age: 54
End: 2024-10-04
Payer: COMMERCIAL

## 2024-10-04 VITALS
SYSTOLIC BLOOD PRESSURE: 98 MMHG | BODY MASS INDEX: 24.77 KG/M2 | HEART RATE: 62 BPM | HEIGHT: 61 IN | WEIGHT: 131.2 LBS | DIASTOLIC BLOOD PRESSURE: 50 MMHG

## 2024-10-04 DIAGNOSIS — D21.9 FIBROIDS: ICD-10-CM

## 2024-10-04 DIAGNOSIS — Z13.820 SCREENING FOR OSTEOPOROSIS: ICD-10-CM

## 2024-10-04 DIAGNOSIS — Z01.419 ENCOUNTER FOR GYNECOLOGICAL EXAMINATION WITHOUT ABNORMAL FINDING: Primary | ICD-10-CM

## 2024-10-04 PROCEDURE — G0145 SCR C/V CYTO,THINLAYER,RESCR: HCPCS | Performed by: OBSTETRICS & GYNECOLOGY

## 2024-10-04 PROCEDURE — S0612 ANNUAL GYNECOLOGICAL EXAMINA: HCPCS | Performed by: OBSTETRICS & GYNECOLOGY

## 2024-10-04 PROCEDURE — 76977 US BONE DENSITY MEASURE: CPT | Performed by: OBSTETRICS & GYNECOLOGY

## 2024-10-04 NOTE — PROGRESS NOTES
Ambulatory Visit  Name: Yajaira Collins      : 1970      MRN: [id number]  Encounter Provider: Torres Nance DO  Encounter Date: 10/4/2024   Encounter department: [facility]    Assessment & Plan  Encounter for gynecological examination without abnormal finding         Fibroids  Stable and asymptomatic-follow       Screening for osteoporosis  Peripheral scan:0.2         History of Present Illness     Yajaira Collins is a 53 y.o. female who presents for annual examination.  She offers no complaints.  Denies any vaginal irritation, burning, discharge or bleeding.  She had trialed HRT for short-term but discontinued.  She is tolerating vasomotor symptoms.  She denies any dysuria, hematuria urgency urinary incontinence.  No GI complaints.    Colon cancer screening via Cologuard 2023.  Negative.      Review of Systems   Constitutional: Negative.    HENT:  Negative for sore throat and trouble swallowing.    Gastrointestinal: Negative.    Genitourinary: Negative.      Past Medical History   Past Medical History:   Diagnosis Date    Cancer (HCC)     Excision  of malignant melanoma left leg    Fibroid     Malignant melanoma of skin (HCC)     Menopause ovarian failure      Past Surgical History:   Procedure Laterality Date    ADENOIDECTOMY      ANAL FISSURECTOMY      With Sphincterotomy    BREAST CYST ASPIRATION Right     benign    EYE SURGERY      MOUTH SURGERY      OTHER SURGICAL HISTORY      Excision Of Lesion Legs Malignant    TONSILLECTOMY       Family History   Problem Relation Age of Onset    Prostate cancer Father 55    Lung cancer Father 78    Cancer Father         Small lung ca    Diabetes Father         Type II    Hypertension Father     Uterine cancer Maternal Aunt     Osteoarthritis Mother     No Known Problems Maternal Grandmother     No Known Problems Maternal Grandfather     No Known Problems Paternal Grandmother     No Known Problems Paternal  Grandfather     No Known Problems Brother     Diabetes Brother         Type I    Hypertension Brother      Current Outpatient Medications on File Prior to Visit   Medication Sig Dispense Refill    ascorbic acid (SM VITAMIN C) 1000 MG tablet Take by mouth      Calcium 500 MG tablet Take by mouth      cholecalciferol (VITAMIN D3) 1,000 units tablet Take 1,000 mg by mouth daily      clotrimazole-betamethasone (LOTRISONE) 1-0.05 % cream Apply topically 2 (two) times a day (Patient taking differently: Apply topically 2 (two) times a day As needed) 45 g 1    MAGNESIUM ASPARTATE PO Take 500 mg by mouth 2 (two) times a day      Omega-3 Fatty Acids (FISH OIL) 1200 MG CAPS Take by mouth daily      estradiol-norethindrone (CombiPatch) 0.05-0.14 MG/DAY Place 1 patch on the skin 2 (two) times a week for 24 doses (Patient not taking: Reported on 10/4/2024) 24 patch 3     No current facility-administered medications on file prior to visit.   No Known Allergies   Current Outpatient Medications on File Prior to Visit   Medication Sig Dispense Refill    ascorbic acid (SM VITAMIN C) 1000 MG tablet Take by mouth      Calcium 500 MG tablet Take by mouth      cholecalciferol (VITAMIN D3) 1,000 units tablet Take 1,000 mg by mouth daily      clotrimazole-betamethasone (LOTRISONE) 1-0.05 % cream Apply topically 2 (two) times a day (Patient taking differently: Apply topically 2 (two) times a day As needed) 45 g 1    MAGNESIUM ASPARTATE PO Take 500 mg by mouth 2 (two) times a day      Omega-3 Fatty Acids (FISH OIL) 1200 MG CAPS Take by mouth daily      estradiol-norethindrone (CombiPatch) 0.05-0.14 MG/DAY Place 1 patch on the skin 2 (two) times a week for 24 doses (Patient not taking: Reported on 10/4/2024) 24 patch 3     No current facility-administered medications on file prior to visit.      Social History     Tobacco Use    Smoking status: Never    Smokeless tobacco: Never   Vaping Use    Vaping status: Never Used   Substance and Sexual  Activity    Alcohol use: Yes     Comment: Socially    Drug use: Never    Sexual activity: Not Currently     Partners: Male         Objective     Wt 59.5 kg (131 lb 3.2 oz)   LMP 09/03/2020 (Approximate)   BMI 24.79 kg/m²     Physical Exam  Vitals reviewed.   Constitutional:       Appearance: Normal appearance. She is normal weight.   Cardiovascular:      Rate and Rhythm: Normal rate and regular rhythm.      Pulses: Normal pulses.      Heart sounds: Normal heart sounds. No murmur heard.  Pulmonary:      Effort: Pulmonary effort is normal. No respiratory distress.      Breath sounds: Normal breath sounds.   Chest:   Breasts:     Right: No swelling, bleeding, inverted nipple, mass, nipple discharge, skin change or tenderness.      Left: No swelling, bleeding, inverted nipple, mass, nipple discharge, skin change or tenderness.   Abdominal:      General: There is no distension.      Palpations: Abdomen is soft. There is no mass.      Tenderness: There is no abdominal tenderness. There is no guarding or rebound.      Hernia: No hernia is present. There is no hernia in the left inguinal area or right inguinal area.   Genitourinary:     General: Normal vulva.      Labia:         Right: No rash, tenderness or lesion.         Left: No rash, tenderness or lesion.       Vagina: Normal.      Cervix: Normal.      Uterus: Enlarged (top normal). Not deviated, not fixed, not tender and no uterine prolapse.       Adnexa:         Right: No mass, tenderness or fullness.          Left: No mass, tenderness or fullness.     Musculoskeletal:      Cervical back: Normal range of motion and neck supple. No tenderness.   Lymphadenopathy:      Cervical: No cervical adenopathy.      Upper Body:      Right upper body: No supraclavicular, axillary or pectoral adenopathy.      Left upper body: No supraclavicular, axillary or pectoral adenopathy.      Lower Body: No right inguinal adenopathy. No left inguinal adenopathy.   Neurological:       Mental Status: She is alert.

## 2024-10-10 ENCOUNTER — HOSPITAL ENCOUNTER (OUTPATIENT)
Dept: RADIOLOGY | Facility: IMAGING CENTER | Age: 54
End: 2024-10-10
Payer: COMMERCIAL

## 2024-10-10 VITALS — BODY MASS INDEX: 23.79 KG/M2 | HEIGHT: 61 IN | WEIGHT: 126 LBS

## 2024-10-10 DIAGNOSIS — Z12.31 ENCOUNTER FOR SCREENING MAMMOGRAM FOR MALIGNANT NEOPLASM OF BREAST: ICD-10-CM

## 2024-10-10 PROCEDURE — 77067 SCR MAMMO BI INCL CAD: CPT

## 2024-10-10 PROCEDURE — 77063 BREAST TOMOSYNTHESIS BI: CPT

## 2024-10-11 LAB
LAB AP GYN PRIMARY INTERPRETATION: NORMAL
Lab: NORMAL